# Patient Record
Sex: FEMALE | Race: WHITE | Employment: PART TIME | URBAN - METROPOLITAN AREA
[De-identification: names, ages, dates, MRNs, and addresses within clinical notes are randomized per-mention and may not be internally consistent; named-entity substitution may affect disease eponyms.]

---

## 2017-02-23 ENCOUNTER — OFFICE VISIT (OUTPATIENT)
Dept: PULMONOLOGY | Age: 57
End: 2017-02-23

## 2017-02-23 VITALS
SYSTOLIC BLOOD PRESSURE: 112 MMHG | HEIGHT: 64 IN | DIASTOLIC BLOOD PRESSURE: 62 MMHG | TEMPERATURE: 98 F | WEIGHT: 118.6 LBS | HEART RATE: 72 BPM | BODY MASS INDEX: 20.25 KG/M2 | OXYGEN SATURATION: 98 % | RESPIRATION RATE: 14 BRPM

## 2017-02-23 DIAGNOSIS — R05.3 COUGH, PERSISTENT: ICD-10-CM

## 2017-02-23 DIAGNOSIS — R91.8 PULMONARY NODULES: ICD-10-CM

## 2017-02-23 DIAGNOSIS — A31.9 MYCOBACTERIAL INFECTION, ATYPICAL: Primary | ICD-10-CM

## 2017-02-23 PROCEDURE — 99213 OFFICE O/P EST LOW 20 MIN: CPT | Performed by: INTERNAL MEDICINE

## 2017-02-23 RX ORDER — BENZONATATE 200 MG/1
200 CAPSULE ORAL 3 TIMES DAILY PRN
Qty: 60 CAPSULE | Refills: 3 | Status: SHIPPED | OUTPATIENT
Start: 2017-02-23 | End: 2018-02-23

## 2017-02-23 RX ORDER — LISINOPRIL 10 MG/1
5 TABLET ORAL DAILY
COMMUNITY

## 2017-02-23 RX ORDER — PRAVASTATIN SODIUM 10 MG
10 TABLET ORAL DAILY
COMMUNITY

## 2018-03-01 ENCOUNTER — OFFICE VISIT (OUTPATIENT)
Dept: PULMONOLOGY | Age: 58
End: 2018-03-01

## 2018-03-01 ENCOUNTER — TELEPHONE (OUTPATIENT)
Dept: PULMONOLOGY | Age: 58
End: 2018-03-01

## 2018-03-01 VITALS
BODY MASS INDEX: 18.95 KG/M2 | WEIGHT: 111 LBS | HEIGHT: 64 IN | RESPIRATION RATE: 16 BRPM | DIASTOLIC BLOOD PRESSURE: 74 MMHG | HEART RATE: 70 BPM | OXYGEN SATURATION: 99 % | SYSTOLIC BLOOD PRESSURE: 108 MMHG | TEMPERATURE: 98.3 F

## 2018-03-01 DIAGNOSIS — R05.3 COUGH, PERSISTENT: ICD-10-CM

## 2018-03-01 DIAGNOSIS — A31.9 MYCOBACTERIAL INFECTION, ATYPICAL: Primary | ICD-10-CM

## 2018-03-01 PROCEDURE — 99213 OFFICE O/P EST LOW 20 MIN: CPT | Performed by: INTERNAL MEDICINE

## 2018-03-01 RX ORDER — ATORVASTATIN CALCIUM 10 MG/1
10 TABLET, FILM COATED ORAL DAILY
COMMUNITY

## 2018-03-01 NOTE — PROGRESS NOTES
[facility]    Outpatient Follow Up Note    CC: NIRAV pulmonary infection  Consulting provider: Mansoor Mahan DO    Interval History: 56 y.o. female  C/o cough for 1 week with yellow sputum. She ha da fever for 1 night a week ago. No SOB    Initial HPI: Ms Marlo Parikh was diagnosed with mycobacterium avium complex in June 2012 after presenting with dyspnea, after a thorough w/u (including cardiac cath) revealed no etiology. She underwent a bronchoscopy due to an abnormal CT scan (nodular lesions > in RML) that revealed a culture + for MAC, along with a small ill ill formed granuloma on transbronchial biopsy. She also grew MAC in a sputum culture in June, but subsequently had negative sputum cultures from July and then again in March. She started therapy for mycobacterium avium complex with azithro, ethambutol, and rifampin three times weekly towards the end of June 2012. She did note improvement in her dyspnea, along with resolution of her cough after start of therapy. Since ace's last visit with me at the end of March (at [facility]), she reports stability of symptoms. (had to cancel May appt due to sister-in-law in hospital with strokes) She can walk flat ground, up to 3-5 miles. Has more difficulty with inclines and when carrying things. No problems with coughing. Not having any organized exercise activity due to having a lot going on. Her sister has cancer of the bile duct, and is not responding to chemo. Ms Marlo Parikh is spending a lot of time helping her out. She does feel part of her difficulty with inclines is deconditioning; she reports it seems to get easier if she has to do it a few days in a row. She reports occasional night sweats, feels may be going into menopause. Having irregular menstrual periods since January. No fevers. Appetite is good and weight is stable at 121 pounds.      Current Medications:    Current Outpatient Prescriptions:     atorvastatin (LIPITOR) 10 MG tablet, Take 10 mg by mouth daily, Disp: , Rfl:     pravastatin (PRAVACHOL) 10 MG tablet, Take 10 mg by mouth daily, Disp: , Rfl:     lisinopril (PRINIVIL;ZESTRIL) 10 MG tablet, Take 5 mg by mouth daily, Disp: , Rfl:     omeprazole (PRILOSEC) 20 MG capsule, Take 20 mg by mouth daily. , Disp: , Rfl:     MULTIPLE VITAMIN PO, Take  by mouth daily. , Disp: , Rfl:     Omega-3 Fatty Acids (FISH OIL) 1000 MG CAPS, Take 3,000 mg by mouth 3 times daily. , Disp: , Rfl:     Objective:   PHYSICAL EXAM:    /74   Pulse 70   Temp 98.3 °F (36.8 °C) (Oral)   Resp 16   Ht 5' 4\" (1.626 m)   Wt 111 lb (50.3 kg)   SpO2 99% Comment: RA  BMI 19.05 kg/m²   Constitutional: In no acute distress. Appears stated age. Well developed and nourished  Eyes: PERRL. No sclera icterus. No conjunctival injection. ENT: Oropharynx clear. Neck: Trachea midline. No thyroid tenderness. Lymph: No cervical LAD. No supraclavicular LAD. Resp: No accessory muscle use. No crackles. No wheezes. No rhonchi. CV: Regular rate. Regular rhythm. No murmur or rub. No lower extremity edema. Skin: Warm and dry. No nodules on exposed extremities. No rash on exposed extremities. Musc: No clubbing. No cyanosis. No synovitis or joint deformity in digits. Psych: Oriented x 3. Mood and affect normal. Intact judgment and insight. LABS:  Reviewed any pertinent new labs that are available. 8/5 AFB sputm negative smear,culture + for NIRAV  8/6 sputum smear/cx negative  8/7 sputum smear/cx negative  11/29, 11/30, 12/1/15 sputum negative    PFTs from 2012 from OSH reviewed by Valdez Hanleystin [place] and reported as normal.    PFTs 1/13/16 FVC  (97%) FEV1  (99%) FEV1/FVC ratio  80  TLC  (100%)  RV  (%)   DLCO (84%) Bronchodilator response: no   6MWT: 1540 ft, 96%    IMAGING:  I personally reviewed and interpreted the following today in the office:   1/13/16 chest CT:  Mediastinum: There are a few scattered calcified mediastinal lymph nodes.  No   mediastinal or hilar